# Patient Record
Sex: FEMALE | Race: OTHER | HISPANIC OR LATINO
[De-identification: names, ages, dates, MRNs, and addresses within clinical notes are randomized per-mention and may not be internally consistent; named-entity substitution may affect disease eponyms.]

---

## 2022-11-08 ENCOUNTER — LABORATORY RESULT (OUTPATIENT)
Age: 37
End: 2022-11-08

## 2022-11-08 ENCOUNTER — APPOINTMENT (OUTPATIENT)
Dept: GASTROENTEROLOGY | Facility: CLINIC | Age: 37
End: 2022-11-08

## 2022-11-08 VITALS
SYSTOLIC BLOOD PRESSURE: 98 MMHG | HEART RATE: 74 BPM | BODY MASS INDEX: 24.66 KG/M2 | WEIGHT: 134 LBS | DIASTOLIC BLOOD PRESSURE: 70 MMHG | TEMPERATURE: 97.2 F | HEIGHT: 62 IN | RESPIRATION RATE: 12 BRPM | OXYGEN SATURATION: 99 %

## 2022-11-08 DIAGNOSIS — O26.619 LIVER AND BILIARY TRACT DISORDERS IN PREGNANCY, UNSPECIFIED TRIMESTER: ICD-10-CM

## 2022-11-08 DIAGNOSIS — Z80.7 FAMILY HISTORY OF OTHER MALIGNANT NEOPLASMS OF LYMPHOID, HEMATOPOIETIC AND RELATED TISSUES: ICD-10-CM

## 2022-11-08 DIAGNOSIS — R79.89 OTHER SPECIFIED ABNORMAL FINDINGS OF BLOOD CHEMISTRY: ICD-10-CM

## 2022-11-08 DIAGNOSIS — Z86.19 PERSONAL HISTORY OF OTHER INFECTIOUS AND PARASITIC DISEASES: ICD-10-CM

## 2022-11-08 DIAGNOSIS — K83.1 LIVER AND BILIARY TRACT DISORDERS IN PREGNANCY, UNSPECIFIED TRIMESTER: ICD-10-CM

## 2022-11-08 DIAGNOSIS — B18.1 CHRONIC VIRAL HEPATITIS B W/OUT DELTA-AGENT: ICD-10-CM

## 2022-11-08 DIAGNOSIS — Z78.9 OTHER SPECIFIED HEALTH STATUS: ICD-10-CM

## 2022-11-08 PROCEDURE — 83014 H PYLORI DRUG ADMIN: CPT

## 2022-11-08 PROCEDURE — 36415 COLL VENOUS BLD VENIPUNCTURE: CPT

## 2022-11-08 PROCEDURE — 99203 OFFICE O/P NEW LOW 30 MIN: CPT | Mod: 25

## 2022-11-08 NOTE — PHYSICAL EXAM
[Alert] : alert [Normal Voice/Communication] : normal voice/communication [Healthy Appearing] : healthy appearing [No Acute Distress] : no acute distress [Sclera] : the sclera and conjunctiva were normal [Hearing Threshold Finger Rub Not [place]] : hearing was normal [Normal Lips/Gums] : the lips and gums were normal [Oropharynx] : the oropharynx was normal [Normal Appearance] : the appearance of the neck was normal [No Neck Mass] : no neck mass was observed [No Respiratory Distress] : no respiratory distress [No Acc Muscle Use] : no accessory muscle use [Respiration, Rhythm And Depth] : normal respiratory rhythm and effort [Auscultation Breath Sounds / Voice Sounds] : lungs were clear to auscultation bilaterally [Heart Rate And Rhythm] : heart rate was normal and rhythm regular [Normal S1, S2] : normal S1 and S2 [Murmurs] : no murmurs [Bowel Sounds] : normal bowel sounds [Abdomen Tenderness] : non-tender [No Masses] : no abdominal mass palpated [Abdomen Soft] : soft [] : no hepatosplenomegaly [Oriented To Time, Place, And Person] : oriented to person, place, and time

## 2022-11-08 NOTE — REVIEW OF SYSTEMS
[Negative] : Heme/Lymph [As Noted in HPI] : as noted in HPI [Abdominal Pain] : abdominal pain [Constipation] : constipation

## 2022-11-10 LAB
AFP-TM SERPL-MCNC: 2.3 NG/ML
ALBUMIN SERPL ELPH-MCNC: 4.9 G/DL
ALP BLD-CCNC: 99 U/L
ALT SERPL-CCNC: 55 U/L
ANA SER IF-ACNC: NEGATIVE
ANION GAP SERPL CALC-SCNC: 13 MMOL/L
AST SERPL-CCNC: 25 U/L
BILIRUB SERPL-MCNC: 0.2 MG/DL
BUN SERPL-MCNC: 13 MG/DL
CALCIUM SERPL-MCNC: 10.3 MG/DL
CHLORIDE SERPL-SCNC: 101 MMOL/L
CHOLEST SERPL-MCNC: 240 MG/DL
CO2 SERPL-SCNC: 24 MMOL/L
CREAT SERPL-MCNC: 0.61 MG/DL
EGFR: 117 ML/MIN/1.73M2
FERRITIN SERPL-MCNC: 93 NG/ML
GGT SERPL-CCNC: 55 U/L
GLUCOSE SERPL-MCNC: 89 MG/DL
HAV IGM SER QL: NONREACTIVE
HBV CORE IGG+IGM SER QL: NONREACTIVE
HBV E AB SER QL: NONREACTIVE
HBV E AG SER QL: NONREACTIVE
HBV SURFACE AB SER QL: REACTIVE
HBV SURFACE AG SER QL: NONREACTIVE
HCV AB SER QL: NONREACTIVE
HCV RNA SERPL NAA+PROBE-LOG IU: NOT DETECTED LOGIU/ML
HCV S/CO RATIO: 0.16 S/CO
HDLC SERPL-MCNC: 61 MG/DL
HEPATITIS A IGG ANTIBODY: NONREACTIVE
HEPB DNA PCR INT: NOT DETECTED
HEPB DNA PCR LOG: NOT DETECTED LOGIU/ML
HEPC RNA INTERP: NOT DETECTED
IRON SATN MFR SERPL: 19 %
IRON SERPL-MCNC: 67 UG/DL
LDLC SERPL CALC-MCNC: 155 MG/DL
NONHDLC SERPL-MCNC: 179 MG/DL
POTASSIUM SERPL-SCNC: 3.9 MMOL/L
PROT SERPL-MCNC: 8 G/DL
SODIUM SERPL-SCNC: 137 MMOL/L
TIBC SERPL-MCNC: 348 UG/DL
TRIGL SERPL-MCNC: 121 MG/DL
TTG IGA SER IA-ACNC: <1.2 U/ML
TTG IGA SER-ACNC: NEGATIVE
TTG IGG SER IA-ACNC: <1.2 U/ML
TTG IGG SER IA-ACNC: NEGATIVE
UIBC SERPL-MCNC: 282 UG/DL
UREA BREATH TEST QL: NEGATIVE

## 2022-11-10 NOTE — CONSULT LETTER
[Dear  ___] : Dear  [unfilled], [Consult Letter:] : I had the pleasure of evaluating your patient, [unfilled]. [Please see my note below.] : Please see my note below. [Consult Closing:] : Thank you very much for allowing me to participate in the care of this patient.  If you have any questions, please do not hesitate to contact me. [Sincerely,] : Sincerely, [FreeTextEntry3] : Navarro White MD, FACP, AGAF, FAASLD\par  of Medicine\par [facility] [employer] [facility]\par

## 2022-11-10 NOTE — ADDENDUM
[FreeTextEntry1] : Fibrscan to be considered at next visit. PRedicted FIB4 likely low prob fibrosis, if assume normal plts.\par No hcv rna.

## 2022-11-10 NOTE — ASSESSMENT
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 Long hx of elevated alt, ast, states has HBV. Born in [facility]. NO hx of jaundice.Complains of bloating, discomfort in epigastrium. Hx of hpylori in distant.  Reports a history of NAFLD D.  She also has a history of constipation.  There is no family history of liver disease.  Her mother had multiple myeloma.\par \par IMP:\par 1. exposure to HCV ( risk of HCV from needle stick is 1.8%)\par 2. HBV by hx\par 3. NAFLD by hx\par 4. Bloating, hx of HPYLORI\par 5. constipation\par \par PLAN:\par 1. HCV, HBV panel, fib4 to be calculated\par 2. Imaging after above reviewed\par 3. UBT\par 4. 4 week followup

## 2022-11-10 NOTE — HISTORY OF PRESENT ILLNESS
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 Long hx of elevated alt, ast, states has HBV. Born in [facility]. NO hx of jaundice.Complains of bloating, discomfort in epigastrium. Hx of hpylori in distant.  Reports a history of NAFLD D.  She also has a history of constipation.  There is no family history of liver disease.  Her mother had multiple myeloma.\par Hx of cholestasis of pregnancy.

## 2022-12-27 ENCOUNTER — APPOINTMENT (OUTPATIENT)
Dept: GASTROENTEROLOGY | Facility: CLINIC | Age: 37
End: 2022-12-27

## 2023-04-17 ENCOUNTER — APPOINTMENT (OUTPATIENT)
Dept: GASTROENTEROLOGY | Facility: CLINIC | Age: 38
End: 2023-04-17

## 2023-06-20 ENCOUNTER — APPOINTMENT (OUTPATIENT)
Dept: GASTROENTEROLOGY | Facility: CLINIC | Age: 38
End: 2023-06-20
Payer: MEDICAID

## 2023-06-20 VITALS
HEART RATE: 88 BPM | RESPIRATION RATE: 12 BRPM | SYSTOLIC BLOOD PRESSURE: 106 MMHG | TEMPERATURE: 97.7 F | OXYGEN SATURATION: 98 % | DIASTOLIC BLOOD PRESSURE: 62 MMHG | BODY MASS INDEX: 24.66 KG/M2 | WEIGHT: 134 LBS | HEIGHT: 62 IN

## 2023-06-20 DIAGNOSIS — R19.8 OTHER SPECIFIED SYMPTOMS AND SIGNS INVOLVING THE DIGESTIVE SYSTEM AND ABDOMEN: ICD-10-CM

## 2023-06-20 DIAGNOSIS — K76.0 FATTY (CHANGE OF) LIVER, NOT ELSEWHERE CLASSIFIED: ICD-10-CM

## 2023-06-20 DIAGNOSIS — R14.0 ABDOMINAL DISTENSION (GASEOUS): ICD-10-CM

## 2023-06-20 DIAGNOSIS — Z20.5 CONTACT WITH AND (SUSPECTED) EXPOSURE TO VIRAL HEPATITIS: ICD-10-CM

## 2023-06-20 PROCEDURE — 99214 OFFICE O/P EST MOD 30 MIN: CPT | Mod: 25

## 2023-06-20 NOTE — PHYSICAL EXAM

## 2023-06-21 LAB
ALBUMIN SERPL ELPH-MCNC: 4.6 G/DL
ALP BLD-CCNC: 101 U/L
ALT SERPL-CCNC: 45 U/L
ANION GAP SERPL CALC-SCNC: 14 MMOL/L
AST SERPL-CCNC: 35 U/L
BILIRUB SERPL-MCNC: <0.2 MG/DL
BUN SERPL-MCNC: 19 MG/DL
CALCIUM SERPL-MCNC: 9.3 MG/DL
CHLORIDE SERPL-SCNC: 105 MMOL/L
CHOLEST SERPL-MCNC: 196 MG/DL
CO2 SERPL-SCNC: 22 MMOL/L
CREAT SERPL-MCNC: 0.64 MG/DL
CRP SERPL-MCNC: <3 MG/L
EGFR: 115 ML/MIN/1.73M2
GLUCOSE SERPL-MCNC: 87 MG/DL
HDLC SERPL-MCNC: 57 MG/DL
LDLC SERPL CALC-MCNC: 117 MG/DL
NONHDLC SERPL-MCNC: 139 MG/DL
POTASSIUM SERPL-SCNC: 4.9 MMOL/L
PROT SERPL-MCNC: 7.4 G/DL
SODIUM SERPL-SCNC: 141 MMOL/L
TRIGL SERPL-MCNC: 108 MG/DL

## 2023-06-21 NOTE — ADDENDUM
[FreeTextEntry1] : Fibroscan to be considered at next visit. PRedicted FIB4 likely low prob fibrosis, if assume normal plts.\par No hcv rna.

## 2023-06-21 NOTE — ASSESSMENT
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 Long hx of elevated alt, ast, states has HBV. Born in [facility]. NO hx of jaundice.Complains of bloating, discomfort in epigastrium. Hx of hpylori in distant.  Reports a history of NAFLD D.  She also has a history of constipation.  There is no family history of liver disease.  Her mother had multiple myeloma.\par Hx of cholestasis of pregnancy.\par \par The patient returns in follow-up today.  We reviewed her laboratory test.  She is immune to hepatitis B and her HBV PCR DNA is undetectable.  Her hepatitis C is undetectable.  Her H. pylori breath test was negative as was her celiac evaluation.  She is not immune to hepatitis A.  Her ALT is 55 with an elevated LDL and excellent HDL at 61.  She has occasional mucus per stool and bloating.  There is no rectal bleeding.  She has formed bowel movements\par \par IMP:\par 1. IBS\par 2. BLoating\par 3. NAFLD\par 4. Immunity to HBV\par 5. No evidence of HCV\par \par PLAN:\par 1. prudent diet\par 2. Repeat HCV PCR, lipid, crp, cbc, liver panel\par 3. Trial of Citrucel vs psyllium 1 tbsp daily\par 4. Pt adivised to call in 2 weeks to report on sxs\par 5. RTO 3months

## 2023-06-21 NOTE — CONSULT LETTER
[Dear  ___] : Dear  [unfilled], [Consult Letter:] : I had the pleasure of evaluating your patient, [unfilled]. [Please see my note below.] : Please see my note below. [Consult Closing:] : Thank you very much for allowing me to participate in the care of this patient.  If you have any questions, please do not hesitate to contact me. [Sincerely,] : Sincerely, [FreeTextEntry3] : Navarro White MD, FACP, AGAF, FAASLD\par  of Medicine\par [facility]\par

## 2023-06-21 NOTE — HISTORY OF PRESENT ILLNESS
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 Long hx of elevated alt, ast, states has HBV. Born in [facility]. NO hx of jaundice.Complains of bloating, discomfort in epigastrium. Hx of hpylori in distant.  Reports a history of NAFLD D.  She also has a history of constipation.  There is no family history of liver disease.  Her mother had multiple myeloma.\par Hx of cholestasis of pregnancy.\par \par The patient returns in follow-up today.  We reviewed her laboratory test.  She is immune to hepatitis B and her HBV PCR DNA is undetectable.  Her hepatitis C is undetectable.  Her H. pylori breath test was negative as was her celiac evaluation.  She is not immune to hepatitis A.  Her ALT is 55 with an elevated LDL and excellent HDL at 61.  She has occasional mucus per stool and bloating.  There is no rectal bleeding.  She has formed bowel movements

## 2023-06-22 LAB
HCV RNA SERPL NAA+PROBE-LOG IU: NOT DETECTED LOGIU/ML
HEPC RNA INTERP: NOT DETECTED

## 2025-02-04 ENCOUNTER — EMERGENCY (EMERGENCY)
Facility: HOSPITAL | Age: 40
LOS: 1 days | Discharge: HOME | End: 2025-02-04
Attending: STUDENT IN AN ORGANIZED HEALTH CARE EDUCATION/TRAINING PROGRAM | Admitting: STUDENT IN AN ORGANIZED HEALTH CARE EDUCATION/TRAINING PROGRAM
Payer: COMMERCIAL

## 2025-02-04 VITALS
OXYGEN SATURATION: 99 % | SYSTOLIC BLOOD PRESSURE: 110 MMHG | HEART RATE: 80 BPM | RESPIRATION RATE: 18 BRPM | TEMPERATURE: 98 F | DIASTOLIC BLOOD PRESSURE: 73 MMHG

## 2025-02-04 VITALS
HEART RATE: 81 BPM | RESPIRATION RATE: 18 BRPM | WEIGHT: 132.06 LBS | TEMPERATURE: 98 F | OXYGEN SATURATION: 99 % | HEIGHT: 62 IN | DIASTOLIC BLOOD PRESSURE: 70 MMHG | SYSTOLIC BLOOD PRESSURE: 108 MMHG

## 2025-02-04 LAB
ALBUMIN SERPL ELPH-MCNC: 4.2 G/DL — SIGNIFICANT CHANGE UP (ref 3.3–5)
ALP SERPL-CCNC: 58 U/L — SIGNIFICANT CHANGE UP (ref 40–120)
ALT FLD-CCNC: 31 U/L — SIGNIFICANT CHANGE UP (ref 4–33)
ANION GAP SERPL CALC-SCNC: 10 MMOL/L — SIGNIFICANT CHANGE UP (ref 7–14)
AST SERPL-CCNC: 35 U/L — HIGH (ref 4–32)
BASOPHILS # BLD AUTO: 0.02 K/UL — SIGNIFICANT CHANGE UP (ref 0–0.2)
BASOPHILS NFR BLD AUTO: 0.3 % — SIGNIFICANT CHANGE UP (ref 0–2)
BILIRUB SERPL-MCNC: 0.4 MG/DL — SIGNIFICANT CHANGE UP (ref 0.2–1.2)
BUN SERPL-MCNC: 10 MG/DL — SIGNIFICANT CHANGE UP (ref 7–23)
CALCIUM SERPL-MCNC: 9.1 MG/DL — SIGNIFICANT CHANGE UP (ref 8.4–10.5)
CHLORIDE SERPL-SCNC: 105 MMOL/L — SIGNIFICANT CHANGE UP (ref 98–107)
CO2 SERPL-SCNC: 20 MMOL/L — LOW (ref 22–31)
CREAT SERPL-MCNC: 0.6 MG/DL — SIGNIFICANT CHANGE UP (ref 0.5–1.3)
EGFR: 116 ML/MIN/1.73M2 — SIGNIFICANT CHANGE UP
EOSINOPHIL # BLD AUTO: 0.09 K/UL — SIGNIFICANT CHANGE UP (ref 0–0.5)
EOSINOPHIL NFR BLD AUTO: 1.5 % — SIGNIFICANT CHANGE UP (ref 0–6)
GLUCOSE SERPL-MCNC: 89 MG/DL — SIGNIFICANT CHANGE UP (ref 70–99)
HCG SERPL-ACNC: <1 MIU/ML — SIGNIFICANT CHANGE UP
HCT VFR BLD CALC: 33.2 % — LOW (ref 34.5–45)
HGB BLD-MCNC: 10.8 G/DL — LOW (ref 11.5–15.5)
IANC: 2.81 K/UL — SIGNIFICANT CHANGE UP (ref 1.8–7.4)
IMM GRANULOCYTES NFR BLD AUTO: 0.2 % — SIGNIFICANT CHANGE UP (ref 0–0.9)
LIDOCAIN IGE QN: 27 U/L — SIGNIFICANT CHANGE UP (ref 7–60)
LYMPHOCYTES # BLD AUTO: 2.46 K/UL — SIGNIFICANT CHANGE UP (ref 1–3.3)
LYMPHOCYTES # BLD AUTO: 41.8 % — SIGNIFICANT CHANGE UP (ref 13–44)
MCHC RBC-ENTMCNC: 29.8 PG — SIGNIFICANT CHANGE UP (ref 27–34)
MCHC RBC-ENTMCNC: 32.5 G/DL — SIGNIFICANT CHANGE UP (ref 32–36)
MCV RBC AUTO: 91.7 FL — SIGNIFICANT CHANGE UP (ref 80–100)
MONOCYTES # BLD AUTO: 0.5 K/UL — SIGNIFICANT CHANGE UP (ref 0–0.9)
MONOCYTES NFR BLD AUTO: 8.5 % — SIGNIFICANT CHANGE UP (ref 2–14)
NEUTROPHILS # BLD AUTO: 2.81 K/UL — SIGNIFICANT CHANGE UP (ref 1.8–7.4)
NEUTROPHILS NFR BLD AUTO: 47.7 % — SIGNIFICANT CHANGE UP (ref 43–77)
NRBC # BLD AUTO: 0 K/UL — SIGNIFICANT CHANGE UP (ref 0–0)
NRBC # BLD: 0 /100 WBCS — SIGNIFICANT CHANGE UP (ref 0–0)
NRBC # FLD: 0 K/UL — SIGNIFICANT CHANGE UP (ref 0–0)
NRBC BLD-RTO: 0 /100 WBCS — SIGNIFICANT CHANGE UP (ref 0–0)
PLATELET # BLD AUTO: 298 K/UL — SIGNIFICANT CHANGE UP (ref 150–400)
POTASSIUM SERPL-MCNC: 5.1 MMOL/L — SIGNIFICANT CHANGE UP (ref 3.5–5.3)
POTASSIUM SERPL-SCNC: 5.1 MMOL/L — SIGNIFICANT CHANGE UP (ref 3.5–5.3)
PROT SERPL-MCNC: 7.5 G/DL — SIGNIFICANT CHANGE UP (ref 6–8.3)
RBC # BLD: 3.62 M/UL — LOW (ref 3.8–5.2)
RBC # FLD: 13.1 % — SIGNIFICANT CHANGE UP (ref 10.3–14.5)
SODIUM SERPL-SCNC: 135 MMOL/L — SIGNIFICANT CHANGE UP (ref 135–145)
WBC # BLD: 5.89 K/UL — SIGNIFICANT CHANGE UP (ref 3.8–10.5)
WBC # FLD AUTO: 5.89 K/UL — SIGNIFICANT CHANGE UP (ref 3.8–10.5)

## 2025-02-04 PROCEDURE — 74177 CT ABD & PELVIS W/CONTRAST: CPT | Mod: 26

## 2025-02-04 PROCEDURE — 99285 EMERGENCY DEPT VISIT HI MDM: CPT

## 2025-02-04 RX ORDER — ACETAMINOPHEN 160 MG/5ML
1000 SUSPENSION ORAL ONCE
Refills: 0 | Status: COMPLETED | OUTPATIENT
Start: 2025-02-04 | End: 2025-02-04

## 2025-02-04 RX ORDER — BACTERIOSTATIC SODIUM CHLORIDE 0.9 %
1000 VIAL (ML) INJECTION ONCE
Refills: 0 | Status: COMPLETED | OUTPATIENT
Start: 2025-02-04 | End: 2025-02-04

## 2025-02-04 RX ORDER — IOHEXOL 350 MG/ML
30 INJECTION, SOLUTION INTRAVENOUS ONCE
Refills: 0 | Status: COMPLETED | OUTPATIENT
Start: 2025-02-04 | End: 2025-02-04

## 2025-02-04 RX ORDER — ONDANSETRON 4 MG/1
4 TABLET, ORALLY DISINTEGRATING ORAL ONCE
Refills: 0 | Status: COMPLETED | OUTPATIENT
Start: 2025-02-04 | End: 2025-02-04

## 2025-02-04 RX ADMIN — ACETAMINOPHEN 400 MILLIGRAM(S): 160 SUSPENSION ORAL at 09:13

## 2025-02-04 RX ADMIN — Medication 1000 MILLILITER(S): at 09:15

## 2025-02-04 RX ADMIN — ONDANSETRON 4 MILLIGRAM(S): 4 TABLET, ORALLY DISINTEGRATING ORAL at 09:13

## 2025-02-04 RX ADMIN — IOHEXOL 30 MILLILITER(S): 350 INJECTION, SOLUTION INTRAVENOUS at 09:15

## 2025-02-04 NOTE — ED ADULT NURSE NOTE - OBJECTIVE STATEMENT
pt received to intake area complaining of abdominal pain, constipation and nausea x 5 days. pt denies chest pain, sob, fever or chills. iv access obtained, labs drawn and sent. will continue to monitor

## 2025-02-04 NOTE — ED PROVIDER NOTE - ATTENDING CONTRIBUTION TO CARE
Daniel Denny DO:  patient seen and evaluated with the resident.  I was present for key portions of the History & Physical, and I agree with the Impression & Plan. 38 yo f No reported PMH, no PSH, PW abdominal discomfort, constipation, no flatus.  Patient reports several days of symptoms.  Has trialed OTC stool softeners with minimal relief.  Reports he is also having lightheadedness, fatigue.  Denies emesis, fever, blood in stool.  Denies history of recent endoscopy/colonoscopy.  Denies constitutional symptoms.  Patient arrives HDS well-appearing, neurovasc intact.  PE as noted.  Mild epigastric and upper abdominal TTP, no guarding/rebound, no distention.  No lower abdominal TTP.  Had shared decision-making with patient, recommended to trial meds and labs.  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  Educated patient's on very low risk of this given she has no identifiable risk factors however she is concerned.  Will proceed with imaging, reassess.

## 2025-02-04 NOTE — ED PROVIDER NOTE - OBJECTIVE STATEMENT
39-year-old female with no significant PMHx presenting with constipation x 1 week.  Was seen by PCP 4 days ago, told to take over-the-counter laxatives.  Patient tried milk of magnesium and Doculax 4 days ago after which she had a small quantity watery bowel movement 4 days ago.  Non-bloody. Has not had a bowel movement since.  Stopped passing gas last night.  Patient now with diffuse abdominal pain, nausea, lightheadedness, dizziness.  Has not vomited. Denies any surgical history.  This is never happened before.  Last colonoscopy was years ago, does not remember when. Denies fever, CP, SOB, dysuria.

## 2025-02-04 NOTE — ED ADULT TRIAGE NOTE - CHIEF COMPLAINT QUOTE
Pt presents to ED ambulatory from home with c/o constipation, nausea and abdominal cramping x5 days. Pt reports no relief with over the counter medication. Denies past medical hx.

## 2025-02-04 NOTE — ED PROVIDER NOTE - NSFOLLOWUPINSTRUCTIONS_ED_ALL_ED_FT
You were seen in the ED for Constipation. You had blood work and imaging done here today. The results are attached within this packet, please bring it with you when you follow up with your PCP and/ specialist in the next 2-3 days. Someone from the ED should call you to help facilitate your specialty follow up appointment.  If you have issues obtaining follow up, please call: [phone] to obtain a doctor or specialist who takes your insurance in your area.    Constipation is when a person has fewer than three bowel movements a week, has difficulty having a bowel movement, or has stools that are dry, hard, or larger than normal. Other symptoms can include abdominal pain or bloating. As people grow older, constipation is more common. A low-fiber diet, not taking in enough fluids, and taking certain medicines, including opioid painkillers, may make constipation worse.     Treatment varies but may include dietary modifications (more fiber-rich foods), lifestyle modifications, and possible medications. You can take the following medications for constipation:  Miralax: 17 grams twice a day, and can increase to three times a day if no relief   Senna: Two tabs 1-2 times per day  Docusate (Colace): 100mg 1-2 times per day   Bisacodyl (Dulcolax): 10mg suppository up to three times a day  Magnesium citrate: 100-240 mL QD-BID  You may also use an enema but no more than 2 doses in 24 hours.    SEEK IMMEDIATE MEDICAL CARE IF YOU HAVE ANY OF THE FOLLOWING SYMPTOMS: bright red blood in your stool, constipation for longer than 4 days, abdominal or rectal pain, unexplained weight loss, or inability to pass gas.

## 2025-02-04 NOTE — ED PROVIDER NOTE - CLINICAL SUMMARY MEDICAL DECISION MAKING FREE TEXT BOX
38 yo female with no PMHx presenting with 1 week of constipation. Has tried milk of magnesium and doculax without sig relief. VS: wnl. PE:  abd soft w. RUQ and LUQ TTP.   DDx includes but is not limited to constipation, pancreatitis, cholecystitis, SBO  Work up: basic labs, lipase, hcg, CT AP  Tx: tylenol, zofran, IVF  Plan: if work up neg will give supportive care instruction and DC pt for GI f/u

## 2025-02-04 NOTE — ED PROVIDER NOTE - PROGRESS NOTE DETAILS
Pain resolved with meds. Still no bowel movement. Labs and imaging results reviewed with pt at bedside. Pt informed of fat stranding around labia and vaginal canal. Pt has appt with her GYN tmr - pt denies  complaints but will follow up about this incidental finding. All questions answered. Care instructions and return precautions discussed.  Patient is stable and ready for DC. Will follow with PCP outpatient about constipation.

## 2025-02-04 NOTE — ED PROVIDER NOTE - PATIENT PORTAL LINK FT
You can access the FollowMyHealth Patient Portal offered by [facility] by registering at the following website: [email or web address]. By joining [employer]’s FollowMyHealth portal, you will also be able to view your health information using other applications (apps) compatible with our system.

## 2025-02-04 NOTE — ED PROVIDER NOTE - PHYSICAL EXAMINATION
Const: Awake, alert, no acute distress.  Appears well.  Moving comfortably on stretcher.  HEENT: NC/AT.  Moist mucous membranes.  Eyes: Extraocular movements intact b/l.  No scleral icterus.  Neck: Full ROM without pain. Supple.  Cardiac: Regular rate and regular rhythm. S1 S2 present.  Resp: No evidence of respiratory distress.  No stridor or wheeze.  Good air entry b/l, breath sounds clear to auscultation b/l.  Abd: Non-distended. Soft, RUQ and LUQ TTP. NO guarding or rigidity.   Skin: Normal coloration.  No rashes, abrasions or lacerations.  Neuro: Awake, alert & oriented x 3.  Moves all extremities symmetrically.  No obvious focal deficits. Const: Awake, alert, no acute distress.  Appears well.  Moving comfortably on stretcher.  HEENT: NC/AT.  Moist mucous membranes.  Eyes: Extraocular movements intact b/l.  No scleral icterus.  Neck: Full ROM without pain. Supple.  Cardiac: Regular rate and regular rhythm. S1 S2 present.  Resp: No evidence of respiratory distress.  No stridor or wheeze.  Good air entry b/l, breath sounds clear to auscultation b/l.  Abd: Non-distended. Soft, RUQ and LUQ TTP. NO guarding or rigidity.   Rectal: chaperone Dr Daniel Denny - external hemorrhoids noted, none internal. No stool ball palpated  Skin: Normal coloration.  No rashes, abrasions or lacerations.  Neuro: Awake, alert & oriented x 3.  Moves all extremities symmetrically.  No obvious focal deficits.
